# Patient Record
Sex: FEMALE | Employment: UNEMPLOYED | URBAN - METROPOLITAN AREA
[De-identification: names, ages, dates, MRNs, and addresses within clinical notes are randomized per-mention and may not be internally consistent; named-entity substitution may affect disease eponyms.]

---

## 2017-02-13 ENCOUNTER — OFFICE VISIT (OUTPATIENT)
Dept: MATERNAL FETAL MEDICINE | Facility: CLINIC | Age: 42
End: 2017-02-13
Attending: OBSTETRICS & GYNECOLOGY
Payer: MEDICAID

## 2017-02-13 ENCOUNTER — HOSPITAL ENCOUNTER (OUTPATIENT)
Dept: ULTRASOUND IMAGING | Facility: CLINIC | Age: 42
Discharge: HOME | End: 2017-02-13
Attending: OBSTETRICS & GYNECOLOGY | Admitting: OBSTETRICS & GYNECOLOGY
Payer: MEDICAID

## 2017-02-13 ENCOUNTER — HOSPITAL ENCOUNTER (OUTPATIENT)
Dept: LAB | Facility: CLINIC | Age: 42
End: 2017-02-13
Attending: OBSTETRICS & GYNECOLOGY
Payer: MEDICAID

## 2017-02-13 DIAGNOSIS — O09.523 AMA (ADVANCED MATERNAL AGE) MULTIGRAVIDA 35+, THIRD TRIMESTER: ICD-10-CM

## 2017-02-13 DIAGNOSIS — O28.0 ABNORMAL QUAD SCREEN: ICD-10-CM

## 2017-02-13 DIAGNOSIS — O44.40 LOW-LYING PLACENTA: ICD-10-CM

## 2017-02-13 DIAGNOSIS — O28.0 ABNORMAL MATERNAL SERUM SCREENING TEST: ICD-10-CM

## 2017-02-13 DIAGNOSIS — O28.0 ABNORMAL MATERNAL SERUM SCREENING TEST: Primary | ICD-10-CM

## 2017-02-13 PROCEDURE — 36415 COLL VENOUS BLD VENIPUNCTURE: CPT | Performed by: OBSTETRICS & GYNECOLOGY

## 2017-02-13 PROCEDURE — 96040 ZZH GENETIC COUNSELING, EACH 30 MINUTES: CPT | Mod: ZF | Performed by: GENETIC COUNSELOR, MS

## 2017-02-13 PROCEDURE — 40000791 ZZHCL STATISTIC VERIFI PRENATAL TRISOMY 21,18,13: Performed by: OBSTETRICS & GYNECOLOGY

## 2017-02-13 PROCEDURE — 76816 OB US FOLLOW-UP PER FETUS: CPT

## 2017-02-13 NOTE — MR AVS SNAPSHOT
After Visit Summary   2/13/2017    Nanci Duggan    MRN: [id number]           Patient Information     Date Of Birth          1975        Visit Information        Provider Department      2/13/2017 10:45 AM Estefany Isbell,  [facility] Maternal Fetal Medicine [facility]        Today's Diagnoses     Abnormal maternal serum screening test    -  1    AMA (advanced maternal age) multigravida 35+, third trimester        Low-lying placenta        Abnormal quad screen           Follow-ups after your visit        Additional Services     MFM Genetic Counseling                 Your next 10 appointments already scheduled     Mar 13, 2017 10:15 AM CDT   [facility] US COMPRE SINGLE F/U with [id number]   [facility] Maternal Fetal Medicine Ultrasound - [facility] ([facility])    [address] [place] [address] [phone]           Wear comfortable clothes and leave your valuables at home.            Mar 13, 2017 10:45 AM CDT   Radiology MD with [facility] MD   [facility] Maternal Fetal Medicine [facility] ([facility])    [address] [place] [id number] [phone]           Please arrive at the time given for your first appointment.  This visit is used internally to schedule the physician's time during your ultrasound.              Future tests that were ordered for you today     Open Future Orders        Priority Expected Expires Ordered    M US Comprehensive Single F/U Routine  2/13/2018 2/13/2017    Verifi Test by [employer] Routine  5/14/2017 2/13/2017    MFM US Comprehensive Single F/U Routine 2/13/2017 2/13/2018 2/13/2017            Who to contact     If you have questions or need follow up information about today's clinic visit or your schedule please contact [facility] MATERNAL FETAL MEDICINE [facility] directly at [phone].  Normal or non-critical lab and imaging results will be communicated to you by Delia  "letter or phone within 4 business days after the clinic has received the results. If you do not hear from us within 7 days, please contact the clinic through [employer] or phone. If you have a critical or abnormal lab result, we will notify you by phone as soon as possible.  Submit refill requests through [employer] or call your pharmacy and they will forward the refill request to us. Please allow 3 business days for your refill to be completed.          Additional Information About Your Visit        [employer] Information     [employer] lets you send messages to your doctor, view your test results, renew your prescriptions, schedule appointments and more. To sign up, go to [email or web address] . Click on \"Log in\" on the left side of the screen, which will take you to the Welcome page. Then click on \"Sign up Now\" on the right side of the page.     You will be asked to enter the access code listed below, as well as some personal information. Please follow the directions to create your username and password.     Your access code is: [email or web address]  Expires: 2017  2:47 PM     Your access code will  in 90 days. If you need help or a new code, please call your [place] clinic or [phone].        Care EveryWhere ID     This is your Care EveryWhere ID. This could be used by other organizations to access your [place] medical records  [id number]        Your Vitals Were     Last Period                   2016            Blood Pressure from Last 3 Encounters:   13 112/82   13 106/70   13 102/70    Weight from Last 3 Encounters:   13 94.3 kg (208 lb)   13 83 kg (183 lb)   13 84.4 kg (186 lb)               Primary Care Provider Office Phone # Fax #    Sandra Yo [phone]       [facility] [address] [facility] [address]        Thank you!     Thank you for choosing [facility]  for your care. Our goal " is always to provide you with excellent care. Hearing back from our patients is one way we can continue to improve our services. Please take a few minutes to complete the written survey that you may receive in the mail after your visit with us. Thank you!             Your Updated Medication List - Protect others around you: Learn how to safely use, store and throw away your medicines at www.disposemymeds.org.          This list is accurate as of: 2/13/17  4:36 PM.  Always use your most recent med list.                   Brand Name Dispense Instructions for use    IRON COMPLEX PO      Take  by mouth.       prenatal multivitamin  plus iron 27-0.8 MG Tabs per tablet     90 tablet    Take 1 tablet by mouth daily.

## 2017-02-13 NOTE — PROGRESS NOTES
"Please see \"Imaging\" tab under \"Chart Review\" for details of today's US.      Estefany Isbell, DO  Maternal-Fetal Medicine  February 13, 2017      "

## 2017-02-13 NOTE — PROGRESS NOTES
2/13/17.  Met with Nanci and her  and , Antoinette from Home on the phone at the request of Dr. Isbell to further discuss the option of Innatal blood screen.   Patient had been seen previously by Bianca Orlando, genetic counselor for abnormal Quad screen of 1/11 for Down syndrome.  No markers were seen on ultrasound.  Patient recently received her card for Medical assistance. After many phone calls with Innatal and Medical assistance, the patient wished to have Innatal screen.  Nanci has two indications for the NIPT test:  AMA and an abnormal Quad screen.  She also has medical assistance, thus Innatal would likely be covered by insurance.  Consent was signed and blood was drawn.  We will contact her with results in 7-10 days.  If positive, additional genetic counseling would be recommended.    Laura Garzon MS, [facility]  Certified Genetic Counselor  [phone]

## 2017-02-13 NOTE — MR AVS SNAPSHOT
After Visit Summary   2/13/2017    Nanci Duggan    MRN: [id number]           Patient Information     Date Of Birth          1975        Visit Information        Provider Department      2/13/2017 11:00 AM Laura Garzon GC [facility] Maternal Fetal Medicine [facility]        Today's Diagnoses     AMA (advanced maternal age) multigravida 35+, third trimester        Abnormal maternal serum screening test           Follow-ups after your visit        Your next 10 appointments already scheduled     Mar 13, 2017 10:15 AM CDT   M US COMPRE SINGLE F/U with [id number]   [facility] Maternal Fetal Medicine Ultrasound - [facility] ([facility])    [address] [place] [address]  [facility] [id number] [phone]           Wear comfortable clothes and leave your valuables at home.            Mar 13, 2017 10:45 AM CDT   [facility]   [facility] Maternal Fetal Medicine [facility] ([facility])    [address] [place] [address]  [facility] [id number] [phone]           Please arrive at the time given for your first appointment.  This visit is used internally to schedule the physician's time during your ultrasound.              Future tests that were ordered for you today     Open Future Orders        Priority Expected Expires Ordered    MFM US Comprehensive Single F/U Routine  2/13/2018 2/13/2017    Verifi Test by [employer] Routine  5/14/2017 2/13/2017    MFM US Comprehensive Single F/U Routine 2/13/2017 2/13/2018 2/13/2017            Who to contact     If you have questions or need follow up information about today's clinic visit or your schedule please contact [facility] MATERNAL FETAL MEDICINE [facility] directly at [phone].  Normal or non-critical lab and imaging results will be communicated to you by MyChart, letter or phone within 4 business days after the clinic has received the results. If you do not hear from us within 7 days,  "please contact the clinic through [employer] or phone. If you have a critical or abnormal lab result, we will notify you by phone as soon as possible.  Submit refill requests through [employer] or call your pharmacy and they will forward the refill request to us. Please allow 3 business days for your refill to be completed.          Additional Information About Your Visit        Trippin [employer] Information     [employer] lets you send messages to your doctor, view your test results, renew your prescriptions, schedule appointments and more. To sign up, go to [email or web address] . Click on \"Log in\" on the left side of the screen, which will take you to the Welcome page. Then click on \"Sign up Now\" on the right side of the page.     You will be asked to enter the access code listed below, as well as some personal information. Please follow the directions to create your username and password.     Your access code is: [email or web address]  Expires: 2017  2:47 PM     Your access code will  in 90 days. If you need help or a new code, please call your [place] clinic or [phone].        Care EveryWhere ID     This is your Care EveryWhere ID. This could be used by other organizations to access your [place] medical records  [id number]        Your Vitals Were     Last Period                   2016            Blood Pressure from Last 3 Encounters:   13 112/82   13 106/70   13 102/70    Weight from Last 3 Encounters:   13 94.3 kg (208 lb)   13 83 kg (183 lb)   13 84.4 kg (186 lb)              We Performed the Following     [facility] Genetic Counseling        Primary Care Provider Office Phone # Fax #    Sandra Yo [phone]       [facility] [address]  [facility] [address]        Thank you!     Thank you for choosing [facility]  for your care. Our goal is always to provide you with excellent care. Hearing back from " our patients is one way we can continue to improve our services. Please take a few minutes to complete the written survey that you may receive in the mail after your visit with us. Thank you!             Your Updated Medication List - Protect others around you: Learn how to safely use, store and throw away your medicines at www.disposemymeds.org.          This list is accurate as of: 2/13/17  2:47 PM.  Always use your most recent med list.                   Brand Name Dispense Instructions for use    IRON COMPLEX PO      Take  by mouth.       prenatal multivitamin  plus iron 27-0.8 MG Tabs per tablet     90 tablet    Take 1 tablet by mouth daily.

## 2017-02-20 ENCOUNTER — TELEPHONE (OUTPATIENT)
Dept: MATERNAL FETAL MEDICINE | Facility: CLINIC | Age: 42
End: 2017-02-20

## 2017-02-20 NOTE — TELEPHONE ENCOUNTER
February 20, 2017. Contacted Nanci Duggan with  and left voicmail messsage on the phone with normal Progenity/Innatal free fetal DNA screening for Down syndrome and trisomies 13 and 18 (see scanned report from [employer]).  Sex chromosome were also assessed and consistent with XX - gender not disclosed.  Discussed high detection rates for fetal Down syndrome, trisomies 13 and 18, and fetal sex chromosome abnormalities; however, not 100%.  Results available in epic for review    Plan:  Routine follow up with primary Ob/Gyn.    Laura Garzon MS, [facility]  [phone].

## 2017-02-21 LAB — LAB SCANNED RESULT: NORMAL

## 2017-03-13 ENCOUNTER — OFFICE VISIT (OUTPATIENT)
Dept: MATERNAL FETAL MEDICINE | Facility: CLINIC | Age: 42
End: 2017-03-13
Attending: OBSTETRICS & GYNECOLOGY
Payer: COMMERCIAL

## 2017-03-13 ENCOUNTER — HOSPITAL ENCOUNTER (OUTPATIENT)
Dept: ULTRASOUND IMAGING | Facility: CLINIC | Age: 42
Discharge: HOME | End: 2017-03-13
Attending: OBSTETRICS & GYNECOLOGY | Admitting: OBSTETRICS & GYNECOLOGY
Payer: COMMERCIAL

## 2017-03-13 DIAGNOSIS — O44.43 LOW-LYING PLACENTA IN THIRD TRIMESTER: Primary | ICD-10-CM

## 2017-03-13 DIAGNOSIS — O44.40 LOW-LYING PLACENTA: ICD-10-CM

## 2017-03-13 PROCEDURE — 76817 TRANSVAGINAL US OBSTETRIC: CPT | Performed by: OBSTETRICS & GYNECOLOGY

## 2017-03-13 PROCEDURE — 76816 OB US FOLLOW-UP PER FETUS: CPT

## 2017-03-13 NOTE — MR AVS SNAPSHOT
After Visit Summary   3/13/2017    Nanci Duggan    MRN: [id number]           Patient Information     Date Of Birth          1975        Visit Information        Provider Department      3/13/2017 10:45 AM Carline Jackson MD [facility] Maternal Fetal Medicine [facility]        Today's Diagnoses     Low-lying placenta in third trimester    -  1       Follow-ups after your visit        Your next 10 appointments already scheduled     Apr 10, 2017 10:15 AM CDT   MFM US COMPRE SINGLE F/U with [id number]   [facility] Maternal Fetal Medicine Ultrasound - [facility] ([facility])    [address] [place] [address] [address]  [place] [phone] [phone]           Wear comfortable clothes and leave your valuables at home.            Apr 10, 2017 10:45 AM CDT   Radiology MD with SH TREY LAZCANO   [facility] Maternal Fetal Medicine [facility] ([facility])    [address] [place] [address] [address]  [place] [phone] [phone]           Please arrive at the time given for your first appointment.  This visit is used internally to schedule the physician's time during your ultrasound.              Future tests that were ordered for you today     Open Future Orders        Priority Expected Expires Ordered    MFM US Comprehensive Single F/U Routine 4/10/2017 3/13/2018 3/13/2017            Who to contact     If you have questions or need follow up information about today's clinic visit or your schedule please contact [facility] MATERNAL FETAL MEDICINE [facility] directly at [phone].  Normal or non-critical lab and imaging results will be communicated to you by [employer], letter or phone within 4 business days after the clinic has received the results. If you do not hear from us within 7 days, please contact the clinic through [employer] or phone. If you have a critical or abnormal lab result, we will notify you by phone as soon as possible.  Submit refill requests through [employer]  "or call your pharmacy and they will forward the refill request to us. Please allow 3 business days for your refill to be completed.          Additional Information About Your Visit        MyChart Information     [employer] lets you send messages to your doctor, view your test results, renew your prescriptions, schedule appointments and more. To sign up, go to [email or web address] . Click on \"Log in\" on the left side of the screen, which will take you to the Welcome page. Then click on \"Sign up Now\" on the right side of the page.     You will be asked to enter the access code listed below, as well as some personal information. Please follow the directions to create your username and password.     Your access code is: [email or web address]  Expires: 2017  3:47 PM     Your access code will  in 90 days. If you need help or a new code, please call your [place] clinic or [phone].        Care EveryWhere ID     This is your Care EveryWhere ID. This could be used by other organizations to access your [place] medical records  [id number]        Your Vitals Were     Last Period                   2016            Blood Pressure from Last 3 Encounters:   13 112/82   13 106/70   13 102/70    Weight from Last 3 Encounters:   13 94.3 kg (208 lb)   13 83 kg (183 lb)   13 84.4 kg (186 lb)               Primary Care Provider Office Phone # Fax #    Sandra Yo [phone]       [facility] [address]  [facility] [address]        Thank you!     Thank you for choosing [facility]  for your care. Our goal is always to provide you with excellent care. Hearing back from our patients is one way we can continue to improve our services. Please take a few minutes to complete the written survey that you may receive in the mail after your visit with us. Thank you!             Your Updated Medication List - Protect " others around you: Learn how to safely use, store and throw away your medicines at www.disposemymeds.org.          This list is accurate as of: 3/13/17 11:59 PM.  Always use your most recent med list.                   Brand Name Dispense Instructions for use    IRON COMPLEX PO      Take  by mouth.       prenatal multivitamin  plus iron 27-0.8 MG Tabs per tablet     90 tablet    Take 1 tablet by mouth daily.

## 2017-03-14 NOTE — PROGRESS NOTES
"Please see \"Imaging\" tab under \"Chart Review\" for details of today's US.    Carline Jackson    "

## 2017-04-10 ENCOUNTER — HOSPITAL ENCOUNTER (OUTPATIENT)
Dept: ULTRASOUND IMAGING | Facility: CLINIC | Age: 42
Discharge: HOME | End: 2017-04-10
Attending: OBSTETRICS & GYNECOLOGY | Admitting: OBSTETRICS & GYNECOLOGY
Payer: COMMERCIAL

## 2017-04-10 ENCOUNTER — OFFICE VISIT (OUTPATIENT)
Dept: MATERNAL FETAL MEDICINE | Facility: CLINIC | Age: 42
End: 2017-04-10
Attending: OBSTETRICS & GYNECOLOGY
Payer: COMMERCIAL

## 2017-04-10 DIAGNOSIS — O09.523 AMA (ADVANCED MATERNAL AGE) MULTIGRAVIDA 35+, THIRD TRIMESTER: ICD-10-CM

## 2017-04-10 DIAGNOSIS — O36.5990: Primary | ICD-10-CM

## 2017-04-10 DIAGNOSIS — O44.43 LOW-LYING PLACENTA IN THIRD TRIMESTER: ICD-10-CM

## 2017-04-10 PROCEDURE — 76816 OB US FOLLOW-UP PER FETUS: CPT

## 2017-04-10 PROCEDURE — 76821 MIDDLE CEREBRAL ARTERY ECHO: CPT | Performed by: OBSTETRICS & GYNECOLOGY

## 2017-04-10 NOTE — MR AVS SNAPSHOT
MRN:[id number]                      After Visit Summary   4/10/2017    Nanci Duggan    MRN: [id number]           Visit Information        Provider Department      4/10/2017 10:45 AM Laureano Rosario MD [facility] Maternal Fetal Medicine [facility]        Your next 10 appointments already scheduled     Apr 13, 2017 11:00 AM CDT   MFM BPP SINGLE with [id number]   [facility]etal [facility] - [facility] ([facility])    [address] [place] [address]  [facility] [address]   [phone]            Apr 13, 2017 11:30 AM CDT   Radiology MD with ИВАН YANG MD   [facility] Maternal Fetal Medicine [facility] ([facility])    [address] [place] [address]  [facility] [address]   [phone]           Please arrive at the time given for your first appointment.  This visit is used internally to schedule the physician's time during your ultrasound.            Apr 17, 2017  3:30 PM CDT   MFM BPP SINGLE with SHMFMUSR2   [facility] Maternal [facility]etal [facility] Ultrasound - [facility] ([facility])    [address] [place] [address]  [facility] [address]   [phone]            Apr 17, 2017  4:00 PM CDT   Radiology MD with ИВАН YANG MD   [facility] Maternal Fetal Medicine [facility] ([facility])    [address] [place] [address]   [phone]           Please arrive at the time given for your first appointment.  This visit is used internally to schedule the physician's time during your ultrasound.            Apr 20, 2017  3:00 PM CDT   MFM BPP SINGLE with SHMFMUSR2   [facility] M[facility]etal [facility] Ultrasound - [facility] ([facility])    [address] [place] [address]  [facility] [address]   [phone]            Apr 20, 2017  3:30 PM CDT   Radiology MD with ИВАН YANG MD   [facility] Maternal Fetal Medicine [facility] ([facility])    [address] [place] [address]  "[address]  [place] [address]   [phone]           Please arrive at the time given for your first appointment.  This visit is used internally to schedule the physician's time during your ultrasound.            Apr 24, 2017  3:00 PM CDT   MFM BPP SINGLE with SHMFMUSR2   [facility] Maternal Fetal Medicine Ultrasound [facility] ([facility])    [address] [place] [address]  [facility] [address]   [phone]            Apr 24, 2017  3:30 PM CDT   Radiology MD with SH TREY LAZCANO   [facility] Maternal Fetal Medicine [facility] ([facility])    [address] [place] [address]  [facility] [address]   [phone]           Please arrive at the time given for your first appointment.  This visit is used internally to schedule the physician's time during your ultrasound.            Apr 27, 2017  3:00 PM CDT   M BPP SINGLE with [email or web address]   [facility] Maternal Fetal Medicine Ultrasound [facility] ([facility])    [address] [place] [address]  [facility] [address]   [phone]            Apr 27, 2017  3:30 PM CDT   Radiology MD with SH TREY LAZCANO   [facility] Maternal Fetal Medicine [facility] ([facility])    [address] [place] [address]  [facility] [address]   [phone]           Please arrive at the time given for your first appointment.  This visit is used internally to schedule the physician's time during your ultrasound.              [employer] Information     [employer] lets you send messages to your doctor, view your test results, renew your prescriptions, schedule appointments and more. To sign up, go to [email or web address] . Click on \"Log in\" on the left side of the screen, which will take you to the Welcome page. Then click on \"Sign up Now\" on the right side of the page.     You will be asked to enter the access code listed below, as well as some personal information. Please follow the directions to create your username and password.     Your " access code is: [email or web address]  Expires: 2017  3:47 PM     Your access code will  in 90 days. If you need help or a new code, please call your [place] clinic or [phone].        Care EveryWhere ID     This is your Care EveryWhere ID. This could be used by other organizations to access your [place] medical records  [id number]

## 2017-04-10 NOTE — PROGRESS NOTES
Please refer to ultrasound report under 'Imaging' Studies of 'Chart Review' tabs.    Laureano Rosario M.D.

## 2017-04-13 ENCOUNTER — OFFICE VISIT (OUTPATIENT)
Dept: MATERNAL FETAL MEDICINE | Facility: CLINIC | Age: 42
End: 2017-04-13
Attending: OBSTETRICS & GYNECOLOGY
Payer: COMMERCIAL

## 2017-04-13 ENCOUNTER — HOSPITAL ENCOUNTER (OUTPATIENT)
Dept: ULTRASOUND IMAGING | Facility: CLINIC | Age: 42
Discharge: HOME | End: 2017-04-13
Attending: OBSTETRICS & GYNECOLOGY | Admitting: OBSTETRICS & GYNECOLOGY
Payer: COMMERCIAL

## 2017-04-13 DIAGNOSIS — O36.5990 PREGNANCY AFFECTED BY FETAL GROWTH RESTRICTION: Primary | ICD-10-CM

## 2017-04-13 DIAGNOSIS — O36.5990: ICD-10-CM

## 2017-04-13 PROCEDURE — 76819 FETAL BIOPHYS PROFIL W/O NST: CPT

## 2017-04-13 PROCEDURE — 76821 MIDDLE CEREBRAL ARTERY ECHO: CPT | Performed by: OBSTETRICS & GYNECOLOGY

## 2017-04-13 NOTE — MR AVS SNAPSHOT
After Visit Summary   4/13/2017    Nanci Duggan    MRN: [id number]           Patient Information     Date Of Birth          1975        Visit Information        Provider Department      4/13/2017 11:30 AM Dniorah George MD [facility] Maternal Fetal Medicine [facility]        Today's Diagnoses     Pregnancy affected by fetal growth restriction    -  1       Follow-ups after your visit        Your next 10 appointments already scheduled     Apr 17, 2017  3:30 PM CDT   DORA BPP SINGLE with SHMFMUSR2   eal Maternal Fetal Medicine Ultrasound - [facility] ([facility])    [address] [place] [id number]   [phone]            Apr 17, 2017  3:30 PM CDT   [facility] Outpatient with [employer]    Services Department ([facility])    [address] [place] [id number]   [phone]            Apr 17, 2017  4:00 PM CDT   Radiology MD with ИВАН YANG MD   [facility] Maternal Fetal Medicine [facility] ([facility])    [address] [place] [phone]   [phone]           Please arrive at the time given for your first appointment.  This visit is used internally to schedule the physician's time during your ultrasound.            Apr 20, 2017  3:00 PM CDT   TREY BPP SINGLE with SHMFMUSR2   eal Maternal Fetal Medicine Ultrasound - [facility] ([facility])    [address] [place] [address]   [phone]            Apr 20, 2017  3:30 PM CDT   Radiology MD with ИВАН YANG MD   [facility] Maternal Fetal Medicine [facility] ([facility])    [address] [place] [address]   [phone]           Please arrive at the time given for your first appointment.  This visit is used internally to schedule the physician's time during your ultrasound.             Apr 24, 2017  3:00 PM CDT   MFM BPP SINGLE with SHMFMUSR2   [facility] - [facility] ([facility])    [address] [place] [phone]            Apr 24, 2017  3:30 PM CDT   Radiology MD with ИВАН YANG MD   [facility] Maternal Fetal Medicine [facility] ([facility])    [address] [place] [phone]           Please arrive at the time given for your first appointment.  This visit is used internally to schedule the physician's time during your ultrasound.            Apr 27, 2017  3:00 PM CDT   MFM BPP SINGLE with SHMFMUSR2   [facility]etal [facility]trasound - [facility] ([facility])    [address] [place] [phone]   [phone]            Apr 27, 2017  3:30 PM CDT   Radiology MD with ИВАН YANG MD   [facility] Maternal Fetal Medicine [facility] ([facility])    [address] [place] [phone]           Please arrive at the time given for your first appointment.  This visit is used internally to schedule the physician's time during your ultrasound.            May 01, 2017  3:00 PM CDT   MFM BPP SINGLE with SHMFMUSR1   [facility]al Maternal Fetal Medicine Ultrasound [facility] ([facility])    [address] [place] [address] [phone]              Who to contact     If you have questions or need follow up information about today's clinic visit or your schedule please contact [facility] directly at [phone].  Normal or non-critical lab and imaging results will be communicated to you by MyChart, letter or phone within 4 business days after the clinic has received the results. If you do not hear from us within 7 days, please contact the clinic through MyChart or phone. If you have a critical or  "abnormal lab result, we will notify you by phone as soon as possible.  Submit refill requests through [employer] or call your pharmacy and they will forward the refill request to us. Please allow 3 business days for your refill to be completed.          Additional Information About Your Visit        Globitelhart Information     [employer] lets you send messages to your doctor, view your test results, renew your prescriptions, schedule appointments and more. To sign up, go to [email or web address] . Click on \"Log in\" on the left side of the screen, which will take you to the Welcome page. Then click on \"Sign up Now\" on the right side of the page.     You will be asked to enter the access code listed below, as well as some personal information. Please follow the directions to create your username and password.     Your access code is: [email or web address]  Expires: 2017  3:47 PM     Your access code will  in 90 days. If you need help or a new code, please call your [facility] clinic or [phone].        Care EveryWhere ID     This is your Care EveryWhere ID. This could be used by other organizations to access your [facility] medical records  [id number]        Your Vitals Were     Last Period                   2016            Blood Pressure from Last 3 Encounters:   13 112/82   13 106/70   13 102/70    Weight from Last 3 Encounters:   13 94.3 kg (208 lb)   13 83 kg (183 lb)   13 84.4 kg (186 lb)              Today, you had the following     No orders found for display       Primary Care Provider Office Phone # Fax #    Sandra Yo [phone]       [facility] [address] [facility] [address]        Thank you!     Thank you for choosing [facility]  for your care. Our goal is always to provide you with excellent care. Hearing back from our patients is one way we can continue to improve our services. Please " take a few minutes to complete the written survey that you may receive in the mail after your visit with us. Thank you!             Your Updated Medication List - Protect others around you: Learn how to safely use, store and throw away your medicines at www.disposemymeds.org.          This list is accurate as of: 4/13/17 12:17 PM.  Always use your most recent med list.                   Brand Name Dispense Instructions for use    IRON COMPLEX PO      Take  by mouth.       prenatal multivitamin  plus iron 27-0.8 MG Tabs per tablet     90 tablet    Take 1 tablet by mouth daily.

## 2017-04-13 NOTE — PROGRESS NOTES
Please see ultrasound report under imaging tab for details on ultrasound performed today.    Dinorah George MD  Maternal-Fetal Medicine Attending

## 2017-04-14 PROBLEM — Z36.89 ENCOUNTER FOR TRIAGE IN PREGNANT PATIENT: Status: ACTIVE | Noted: 2017-04-14

## 2017-04-14 PROBLEM — O16.9 HYPERTENSION IN PREGNANCY: Status: ACTIVE | Noted: 2017-04-14

## 2017-04-14 PROBLEM — O14.93 PRE-ECLAMPSIA IN THIRD TRIMESTER: Status: ACTIVE | Noted: 2017-04-14

## 2017-04-15 ENCOUNTER — ANESTHESIA EVENT (OUTPATIENT)
Dept: OBGYN | Facility: CLINIC | Age: 42
End: 2017-04-15
Payer: COMMERCIAL

## 2017-04-15 ENCOUNTER — ANESTHESIA (OUTPATIENT)
Dept: OBGYN | Facility: CLINIC | Age: 42
End: 2017-04-15
Payer: COMMERCIAL

## 2017-04-15 PROCEDURE — 25000125 ZZHC RX 250

## 2017-04-15 PROCEDURE — S0020 INJECTION, BUPIVICAINE HYDRO: HCPCS | Performed by: ANESTHESIOLOGY

## 2017-04-15 PROCEDURE — 25000125 ZZHC RX 250: Performed by: ANESTHESIOLOGY

## 2017-04-15 RX ORDER — LIDOCAINE HYDROCHLORIDE AND EPINEPHRINE 15; 5 MG/ML; UG/ML
INJECTION, SOLUTION EPIDURAL PRN
Status: DISCONTINUED | OUTPATIENT
Start: 2017-04-15 | End: 2017-04-15 | Stop reason: HOSPADM

## 2017-04-15 RX ADMIN — LIDOCAINE HYDROCHLORIDE,EPINEPHRINE BITARTRATE 3 ML: 15; .005 INJECTION, SOLUTION EPIDURAL; INFILTRATION; INTRACAUDAL; PERINEURAL at 15:05

## 2017-04-15 RX ADMIN — Medication 5 ML: at 15:16

## 2017-04-15 RX ADMIN — Medication 6 ML/HR: at 15:22

## 2017-04-15 NOTE — ANESTHESIA PREPROCEDURE EVALUATION
Anesthesia Evaluation     . Pt has had prior anesthetic. Type: General           ROS/MED HX    ENT/Pulmonary:     (+)MARIA INES risk factors hypertension, obese, , . .    Neurologic:  - neg neurologic ROS     Cardiovascular:     (+) hypertension-range: 160's, ---. : . . . :. .       METS/Exercise Tolerance:     Hematologic:     (+) Anemia, -      Musculoskeletal:   (+) , , other musculoskeletal- s/p shoulder surgery, low back pain      GI/Hepatic:     (+) Other GI/Hepatic Elevated LFT's      Renal/Genitourinary: Comment: Proteinuria    (+) chronic renal disease,       Endo:     (+) Obesity, .      Psychiatric:  - neg psychiatric ROS       Infectious Disease:  - neg infectious disease ROS       Malignancy:         Other:    (+) Possibly pregnant   - neg other ROS                 Physical Exam  Normal systems: cardiovascular, pulmonary and dental    Airway   Mallampati: III  TM distance: >3 FB  Neck ROM: full  Comment: Very short thick neck.    Dental     Cardiovascular   Rhythm and rate: regular and normal      Pulmonary    breath sounds clear to auscultation                    Anesthesia Plan      History & Physical Review  History and physical reviewed and following examination; no interval change.    ASA Status:  3 emergent.    NPO Status:  Full stomach    Plan for Epidural with Other induction. Maintenance will be Other.           Postoperative Care  Postoperative pain management:  Neuraxial analgesia.      Consents  Anesthetic plan, risks, benefits and alternatives discussed with:  Patient..

## 2017-04-15 NOTE — ANESTHESIA PROCEDURE NOTES
Epidural Procedure Note    Staff:     Anesthesiologist:  SUJIT MONROE  Location: OB     Procedure start time:  4/15/2017 2:50 PM     Procedure end time:  4/15/2017 3:25 PM   Pre-procedure checklist:   patient identified, IV checked, site marked, risks and benefits discussed, informed consent, monitors and equipment checked, pre-op evaluation, at physician/surgeon's request and post-op pain management      Correct Patient: Yes      Correct Position: Yes      Correct Site: Yes      Correct Procedure: Yes      Correct Laterality:  Yes    Site Marked:  Yes  Procedure:     Procedure:  Epidural catheter    ASA:  3    Diagnosis:  IOL due to pre-eclampsia with severe features    Position:  Sitting    Sterile Prep: chloraprep, mask, sterile gloves and patient draped      Insertion site:  L3-4    Local skin infiltration:  1% lidocaine    amount (mL):  3    Approach:  Midline    Needle gauge (G):  17    Needle Length (in):  3.5    Block Needle Type:  Lawrenceuhy    Injection Technique:  LORT saline    HARI at (cm):  7.5    Attempts:  1    Redirects:  0    Catheter gauge (G):  19    Catheter threaded easily: Yes      Threaded to cm at skin:  13    Threaded in epidural space (cm):  5.5    Paresthesias:  No    Aspiration negative for Heme or CSF: Yes      Test dose (mL):  3     Local anesthetic:  Lidocaine 1.5% w/ 1:200,000 epinephrine    Test dose time:  15:05    Test dose negative for signs of intravascular, subdural or intrathecal injection: Yes    Assessment/Narrative:     Sensory Level Left:  T12    Sensory Level Right:  T10

## 2017-04-18 ENCOUNTER — OFFICE VISIT (OUTPATIENT)
Dept: INTERPRETER SERVICES | Facility: CLINIC | Age: 42
End: 2017-04-18

## 2017-04-20 ENCOUNTER — OFFICE VISIT (OUTPATIENT)
Dept: INTERPRETER SERVICES | Facility: CLINIC | Age: 42
End: 2017-04-20

## 2017-04-21 ENCOUNTER — OFFICE VISIT (OUTPATIENT)
Dept: INTERPRETER SERVICES | Facility: CLINIC | Age: 42
End: 2017-04-21

## 2017-04-26 ENCOUNTER — LACTATION ENCOUNTER (OUTPATIENT)
Age: 42
End: 2017-04-26

## 2017-04-26 NOTE — LACTATION NOTE
"This note was copied from a baby's chart.  D: I met with mom, dad and  for discharge teaching.   I: I gave her a feeding log to use at home and went over the need for 8-12 feedings per day and how many wet diapers and stools she should see each day to show adequate intake. We discussed home storage of breast milk, weaning from the nipple shield and pumping, and transitioning to full breastfeeding at home.  I gave the mother [place]uvian handouts on all of these topics as well as extra nipple shields. We discussed growth spurts, birth control and other medications, paced bottlefeeding, Babyweigh rental scales, and resources for help at home/ when to seek outpatient help.  She verbalized understanding via teach back.  She stated she is making 1-2 bottles per pumping q3hr (pumping for 10-15\") and Aileen is exclusively nursing except a few bottles daily (will clarify in rounds whether avelino needs 2 fortified bottles daily).   A: Mom has information and equipment she needs to feed her baby at home.   P: I encouraged her to call with any breastfeeding questions she may have in the future.         Discharge Instructions for Aileen and Nanci Koromarera    Pumping:  Continue to pump after every feeding until Aileen is no longer needing any supplements and is able to take all feedings at breast.  Then wean from pumping as described in the blue handout.    Nipple Shield:  Continue to use until Aileen is taking all feedings at breast and suck is NOTICEABLY stronger, then wean as described in yellow handout.  Typically, this is the last to go (usually wean from bottles 1st, then the pump 2nd)    Supplementation:  Supplement as needed/ medically ordered.  Read through the purple handout on transitioning to full breastfeedings at home for the information it contains.    Additional Instructions:  Make sure Aileen is eating at least 8 times a day, has at least 6-8 wet diapers in 24 hours, and 4 stools in 24 hours, to " "show adequate intake.  You may find a rental Babyweigh scale helpful in transitioning.    Birth Control and Other Medications: Avoid hormonal birth control for as long as possible and until your milk supply is well established, as it may impact your supply.  Some women also find decongestants and antihistamines may impact supply.  Always get a second opinion from a lactation consultant if told to stop breastfeeding or \"pump and dump\" when starting a new medication; most medications are compatible.    Growth Spurts: Common times for \"growth spurts\" are around 7-10 days, 2-3 weeks, 4-6 weeks, 3 months, 4 months, 6 months and 9 months, but these vary widely between babies.  During these times allow your baby to nurse very frequently (or pump more frequently) to temporarily boost your supply, as opposed to supplementing.  It should pass in a few days when your supply increases, and your baby will settle into a new feeding pattern.    Resources for rental scales:   [employer] [facility]       [phone]   i[employer] ([facility])   [phone]  [facility] [employer]       [phone]     Outpatient lactation resources:   [facility] (Mon Wed Fri) [phone]  Breastfeeding Connection at [facility]  [phone]   Breastfeeding Connection at [facility]   [phone]  [facility] Lactation Services    [phone]  [facility]       [phone]  [facility]      [phone]  [facility]      [phone]  [facility]      [phone]    [facility]       [phone]                   BabyCafes (www.babycafeusa.org):  [facility] [place] (Thursdays 12:30-2:30)   [phone]  [facility] [place] ((Tuesdays (Tuesday 9:30-11:30)  [phone]  [facility] [place] (Wednesday 11a-1p)   [phone]  [employer] [facility] (Wednesdays " (12n-2p)    [phone]  [facility] [place] (Mondays 10a-12n)    [phone]  [facility] (Wed 12:30p-2:30p)   [phone]  [facility] [place] (Wednesdays 10a-12n    [phone]  [facility] [place] (Mondays 10a-12n)    [phone]    Other Walk-In Lactaton Help and Resources  [facility] [facility]ri/ Mara Pérez (Tues/Wed)   [phone]  [facility] (Thurs 2:30-3:30)   [phone]  [facility] Baby Weigh In (various times and locations)  www.[employer]    WIC (call for eligibility information)     [phone]    [employer]   www.llli.org  1-410-9-LA-LECTRINA ([phone])    Shiloh Stratton RNC, IBCLC/ Latonya Rider RNC, IBCLC/ Renee Berg RNC, IBCLC [phone]